# Patient Record
Sex: FEMALE | Race: WHITE | NOT HISPANIC OR LATINO | URBAN - METROPOLITAN AREA
[De-identification: names, ages, dates, MRNs, and addresses within clinical notes are randomized per-mention and may not be internally consistent; named-entity substitution may affect disease eponyms.]

---

## 2022-07-27 ENCOUNTER — OFFICE VISIT (OUTPATIENT)
Dept: URBAN - METROPOLITAN AREA CLINIC 27 | Facility: CLINIC | Age: 62
End: 2022-07-27
Payer: COMMERCIAL

## 2022-07-27 ENCOUNTER — DASHBOARD ENCOUNTERS (OUTPATIENT)
Age: 62
End: 2022-07-27

## 2022-07-27 ENCOUNTER — WEB ENCOUNTER (OUTPATIENT)
Dept: URBAN - METROPOLITAN AREA CLINIC 27 | Facility: CLINIC | Age: 62
End: 2022-07-27

## 2022-07-27 VITALS
SYSTOLIC BLOOD PRESSURE: 102 MMHG | TEMPERATURE: 97.5 F | RESPIRATION RATE: 17 BRPM | BODY MASS INDEX: 18.33 KG/M2 | DIASTOLIC BLOOD PRESSURE: 75 MMHG | HEIGHT: 65 IN | WEIGHT: 110 LBS | HEART RATE: 71 BPM

## 2022-07-27 DIAGNOSIS — Z85.3 HISTORY OF BREAST CANCER: ICD-10-CM

## 2022-07-27 DIAGNOSIS — Z12.11 COLON CANCER SCREENING: ICD-10-CM

## 2022-07-27 PROBLEM — 429087003: Status: ACTIVE | Noted: 2022-07-27

## 2022-07-27 PROCEDURE — 99203 OFFICE O/P NEW LOW 30 MIN: CPT | Performed by: INTERNAL MEDICINE

## 2022-07-27 PROCEDURE — 99242 OFF/OP CONSLTJ NEW/EST SF 20: CPT | Performed by: INTERNAL MEDICINE

## 2022-07-27 RX ORDER — LETROZOLE 2.5 MG/1
1 TABLET TABLET, FILM COATED ORAL ONCE A DAY
Status: ACTIVE | COMMUNITY

## 2022-07-27 RX ORDER — BUPROPION HYDROCHLORIDE 100 MG/1
1 TABLET TABLET, FILM COATED ORAL TWICE A DAY
Status: ACTIVE | COMMUNITY

## 2022-07-27 NOTE — HPI-TODAY'S VISIT:
The patient was referred by Dr. Olea for initial screening colonoscopy. A copy of this document is being forwarded to the referring provider.  He denies constipation, diarrhea, hematochezia, melena, N/V, anorexia or weight loss. No family history of colon cancer or polyps.

## 2022-08-15 ENCOUNTER — OFFICE VISIT (OUTPATIENT)
Dept: URBAN - METROPOLITAN AREA SURGERY CENTER 7 | Facility: SURGERY CENTER | Age: 62
End: 2022-08-15
Payer: COMMERCIAL

## 2022-08-15 DIAGNOSIS — Z12.11 COLON CANCER SCREENING: ICD-10-CM

## 2022-08-15 DIAGNOSIS — D12.2 ADENOMA OF ASCENDING COLON: ICD-10-CM

## 2022-08-15 PROCEDURE — 45385 COLONOSCOPY W/LESION REMOVAL: CPT | Performed by: INTERNAL MEDICINE

## 2022-08-15 PROCEDURE — G8907 PT DOC NO EVENTS ON DISCHARG: HCPCS | Performed by: INTERNAL MEDICINE

## 2022-08-15 RX ORDER — LETROZOLE 2.5 MG/1
1 TABLET TABLET, FILM COATED ORAL ONCE A DAY
Status: ACTIVE | COMMUNITY

## 2022-08-15 RX ORDER — BUPROPION HYDROCHLORIDE 100 MG/1
1 TABLET TABLET, FILM COATED ORAL TWICE A DAY
Status: ACTIVE | COMMUNITY